# Patient Record
Sex: MALE | Race: WHITE | NOT HISPANIC OR LATINO | Employment: FULL TIME | URBAN - METROPOLITAN AREA
[De-identification: names, ages, dates, MRNs, and addresses within clinical notes are randomized per-mention and may not be internally consistent; named-entity substitution may affect disease eponyms.]

---

## 2023-11-09 PROBLEM — Z96.611 HISTORY OF RIGHT SHOULDER REPLACEMENT: Status: ACTIVE | Noted: 2023-11-09

## 2023-11-09 PROBLEM — E78.5 HYPERLIPIDEMIA: Status: ACTIVE | Noted: 2023-11-09

## 2023-11-09 PROBLEM — M19.90 OSTEOARTHRITIS: Status: ACTIVE | Noted: 2023-11-09

## 2023-11-09 PROBLEM — R73.9 HYPERGLYCEMIA: Status: ACTIVE | Noted: 2023-11-09

## 2023-11-09 PROBLEM — E66.3 OVERWEIGHT WITH BODY MASS INDEX (BMI) OF 28 TO 28.9 IN ADULT: Status: ACTIVE | Noted: 2023-11-09

## 2023-11-09 PROBLEM — I10 HYPERTENSION: Status: ACTIVE | Noted: 2023-11-09

## 2023-11-09 PROBLEM — N52.9 ERECTILE DYSFUNCTION: Status: ACTIVE | Noted: 2023-11-09

## 2023-11-09 PROBLEM — K57.90 DIVERTICULOSIS: Status: ACTIVE | Noted: 2023-11-09

## 2023-11-09 PROBLEM — N52.9 INABILITY TO MAINTAIN ERECTION: Status: ACTIVE | Noted: 2023-11-09

## 2023-11-09 PROBLEM — R93.89 ABNORMAL CT OF THE CHEST: Status: ACTIVE | Noted: 2023-11-09

## 2023-11-09 PROBLEM — K50.90 CROHN DISEASE (MULTI): Status: ACTIVE | Noted: 2023-11-09

## 2023-11-09 PROBLEM — R35.1 NOCTURIA: Status: ACTIVE | Noted: 2023-11-09

## 2023-11-09 RX ORDER — LISINOPRIL 10 MG/1
1 TABLET ORAL DAILY
COMMUNITY
End: 2023-11-10 | Stop reason: SDUPTHER

## 2023-11-09 RX ORDER — METOPROLOL SUCCINATE 100 MG/1
1 TABLET, EXTENDED RELEASE ORAL DAILY
COMMUNITY
End: 2023-11-10 | Stop reason: SDUPTHER

## 2023-11-09 RX ORDER — SULFASALAZINE 500 MG/1
1 TABLET ORAL 2 TIMES DAILY
COMMUNITY
Start: 2021-10-05

## 2023-11-09 RX ORDER — ATORVASTATIN CALCIUM 20 MG/1
1 TABLET, FILM COATED ORAL NIGHTLY
COMMUNITY
End: 2023-11-10 | Stop reason: SDUPTHER

## 2023-11-10 ENCOUNTER — OFFICE VISIT (OUTPATIENT)
Dept: PRIMARY CARE | Facility: CLINIC | Age: 63
End: 2023-11-10
Payer: COMMERCIAL

## 2023-11-10 VITALS
SYSTOLIC BLOOD PRESSURE: 136 MMHG | TEMPERATURE: 98 F | DIASTOLIC BLOOD PRESSURE: 88 MMHG | HEIGHT: 72 IN | WEIGHT: 212 LBS | HEART RATE: 76 BPM | BODY MASS INDEX: 28.71 KG/M2

## 2023-11-10 DIAGNOSIS — I10 HYPERTENSION, UNSPECIFIED TYPE: ICD-10-CM

## 2023-11-10 DIAGNOSIS — Z12.5 SCREENING FOR PROSTATE CANCER: ICD-10-CM

## 2023-11-10 DIAGNOSIS — E78.5 HYPERLIPIDEMIA, UNSPECIFIED HYPERLIPIDEMIA TYPE: ICD-10-CM

## 2023-11-10 DIAGNOSIS — Z23 NEED FOR INFLUENZA VACCINATION: ICD-10-CM

## 2023-11-10 DIAGNOSIS — R73.9 HYPERGLYCEMIA: Primary | ICD-10-CM

## 2023-11-10 PROBLEM — I80.8 SUPERFICIAL THROMBOPHLEBITIS OF UPPER EXTREMITY: Status: ACTIVE | Noted: 2023-11-10

## 2023-11-10 PROBLEM — N52.9 INABILITY TO MAINTAIN ERECTION: Status: RESOLVED | Noted: 2023-11-09 | Resolved: 2023-11-10

## 2023-11-10 PROBLEM — M19.011 OSTEOARTHRITIS OF RIGHT GLENOHUMERAL JOINT: Status: ACTIVE | Noted: 2023-11-10

## 2023-11-10 PROBLEM — R93.89 ABNORMAL CT OF THE CHEST: Status: RESOLVED | Noted: 2023-11-09 | Resolved: 2023-11-10

## 2023-11-10 PROBLEM — I80.8 SUPERFICIAL THROMBOPHLEBITIS OF UPPER EXTREMITY: Status: RESOLVED | Noted: 2023-11-10 | Resolved: 2023-11-10

## 2023-11-10 PROBLEM — R29.898 WEAKNESS OF HAND: Status: ACTIVE | Noted: 2023-11-10

## 2023-11-10 LAB
NON-UH HIE A/G RATIO: 1.6
NON-UH HIE ALB: 4.2 G/DL (ref 3.4–5)
NON-UH HIE ALK PHOS: 55 UNIT/L (ref 45–117)
NON-UH HIE BILIRUBIN, TOTAL: 0.7 MG/DL (ref 0.3–1.2)
NON-UH HIE BUN/CREAT RATIO: 17.3
NON-UH HIE BUN: 19 MG/DL (ref 9–23)
NON-UH HIE CALCIUM: 9.6 MG/DL (ref 8.7–10.4)
NON-UH HIE CALCULATED LDL CHOLESTEROL: 94 MG/DL (ref 60–130)
NON-UH HIE CALCULATED OSMOLALITY: 284 MOSM/KG (ref 275–295)
NON-UH HIE CHLORIDE: 107 MMOL/L (ref 98–107)
NON-UH HIE CHOLESTEROL: 150 MG/DL (ref 100–200)
NON-UH HIE CO2, VENOUS: 28 MMOL/L (ref 20–31)
NON-UH HIE CREATININE: 1.1 MG/DL (ref 0.6–1.1)
NON-UH HIE GFR AA: >60
NON-UH HIE GLOBULIN: 2.7 G/DL
NON-UH HIE GLOMERULAR FILTRATION RATE: >60 ML/MIN/1.73M?
NON-UH HIE GLUCOSE: 109 MG/DL (ref 74–106)
NON-UH HIE GOT: 30 UNIT/L (ref 15–37)
NON-UH HIE GPT: 30 UNIT/L (ref 10–49)
NON-UH HIE HCT: 44 % (ref 41–52)
NON-UH HIE HDL CHOLESTEROL: 46 MG/DL (ref 40–60)
NON-UH HIE HGB A1C: 5.6 %
NON-UH HIE HGB: 14.3 G/DL (ref 13.5–17.5)
NON-UH HIE INSTR WBC ND: 6.4
NON-UH HIE K: 4.4 MMOL/L (ref 3.5–5.1)
NON-UH HIE MCH: 26.6 PG (ref 27–34)
NON-UH HIE MCHC: 32.5 G/DL (ref 32–37)
NON-UH HIE MCV: 82 FL (ref 80–100)
NON-UH HIE MPV: 8 FL (ref 7.4–10.4)
NON-UH HIE NA: 141 MMOL/L (ref 135–145)
NON-UH HIE PLATELET: 184 X10 (ref 150–450)
NON-UH HIE PROSTATIC SPECIFIC ANTIGEN: 1.2 NG/ML (ref 0–4)
NON-UH HIE RBC: 5.37 X10 (ref 4.7–6.1)
NON-UH HIE RDW: 13.9 % (ref 11.5–14.5)
NON-UH HIE TOTAL CHOL/HDL CHOL RATIO: 3.3
NON-UH HIE TOTAL PROTEIN: 6.9 G/DL (ref 5.7–8.2)
NON-UH HIE TRIGLYCERIDES: 50 MG/DL (ref 30–150)
NON-UH HIE WBC: 6.4 X10 (ref 4.5–11)

## 2023-11-10 PROCEDURE — 1036F TOBACCO NON-USER: CPT | Performed by: INTERNAL MEDICINE

## 2023-11-10 PROCEDURE — 99396 PREV VISIT EST AGE 40-64: CPT | Performed by: INTERNAL MEDICINE

## 2023-11-10 PROCEDURE — 90471 IMMUNIZATION ADMIN: CPT | Performed by: INTERNAL MEDICINE

## 2023-11-10 PROCEDURE — 90686 IIV4 VACC NO PRSV 0.5 ML IM: CPT | Performed by: INTERNAL MEDICINE

## 2023-11-10 PROCEDURE — 3075F SYST BP GE 130 - 139MM HG: CPT | Performed by: INTERNAL MEDICINE

## 2023-11-10 PROCEDURE — 3079F DIAST BP 80-89 MM HG: CPT | Performed by: INTERNAL MEDICINE

## 2023-11-10 RX ORDER — METOPROLOL SUCCINATE 100 MG/1
100 TABLET, EXTENDED RELEASE ORAL DAILY
Qty: 90 TABLET | Refills: 3 | Status: SHIPPED | OUTPATIENT
Start: 2023-11-10 | End: 2024-01-15 | Stop reason: SDUPTHER

## 2023-11-10 RX ORDER — METRONIDAZOLE 250 MG/1
250 TABLET ORAL 3 TIMES DAILY
COMMUNITY
Start: 2023-07-17 | End: 2024-04-25 | Stop reason: WASHOUT

## 2023-11-10 RX ORDER — ATORVASTATIN CALCIUM 20 MG/1
20 TABLET, FILM COATED ORAL NIGHTLY
Qty: 90 TABLET | Refills: 3 | Status: SHIPPED | OUTPATIENT
Start: 2023-11-10 | End: 2024-01-15 | Stop reason: SDUPTHER

## 2023-11-10 RX ORDER — LISINOPRIL 10 MG/1
10 TABLET ORAL DAILY
Qty: 90 TABLET | Refills: 3 | Status: SHIPPED | OUTPATIENT
Start: 2023-11-10 | End: 2024-01-15 | Stop reason: SDUPTHER

## 2023-11-10 ASSESSMENT — COLUMBIA-SUICIDE SEVERITY RATING SCALE - C-SSRS
1. IN THE PAST MONTH, HAVE YOU WISHED YOU WERE DEAD OR WISHED YOU COULD GO TO SLEEP AND NOT WAKE UP?: NO
6. HAVE YOU EVER DONE ANYTHING, STARTED TO DO ANYTHING, OR PREPARED TO DO ANYTHING TO END YOUR LIFE?: NO
2. HAVE YOU ACTUALLY HAD ANY THOUGHTS OF KILLING YOURSELF?: NO

## 2023-11-10 ASSESSMENT — PATIENT HEALTH QUESTIONNAIRE - PHQ9
1. LITTLE INTEREST OR PLEASURE IN DOING THINGS: NOT AT ALL
SUM OF ALL RESPONSES TO PHQ9 QUESTIONS 1 AND 2: 0
2. FEELING DOWN, DEPRESSED OR HOPELESS: NOT AT ALL

## 2023-11-10 ASSESSMENT — ENCOUNTER SYMPTOMS
OCCASIONAL FEELINGS OF UNSTEADINESS: 0
DEPRESSION: 0
LOSS OF SENSATION IN FEET: 0

## 2023-11-10 NOTE — PROGRESS NOTES
Chief Complaint   Patient presents with    Annual Exam     62 y.o. man  Last seen 11/2022       last 11/2022  Past Medical History  Hypertension  Hyperlipidemia  Crohn's disease Dr. Pilar Mariano  Anemia, chronic  Diverticulitis  Erectile dysfunction  Hyperglycemia  OA, right shoulder Dr French  RIGHT shoulder replacement 2021  No known drug allergies     Social history   to [place]. No children. Works as an  for [employer]. He is lifelong non-smoker. He exercises routinely.      Blood pressure 136/88, pulse 76, temperature 36.7 °C (98 °F), temperature source Temporal, height 1.829 m (6'), weight 96.2 kg (212 lb).    Vital reviewed  HEENT:Nc/at, EOMI.  Ears tm intact  Throat no exudate  Neck: no goiter  Lymph nodes:  no cervical/clavicular adenopathy  CV: RRR S1 S2 normal. No murmur  Lungs: CTA without wrr. Breath sounds symmetric  Abdomen: normoactive. Soft, nontender. No mass.  Extremities: no pretibial edema  Neuro: speech intact.  Strength 5/5 sensation intact to light touch,no tremor  EOMI, PERRL  No facial asymmetry  Skin: no rash noted.     Labs SW 10/2022 cbc, cmp  Wbc 8.6 hg 14.2 platelet 196  Cr 1.2 glucose elevated 109 liver function tests negative      labs done today await results    George was seen today for annual exam.  Diagnoses and all orders for this visit:  Hyperglycemia (Primary)  -     Comprehensive Metabolic Panel; Future  -     CBC; Future  -     Hemoglobin A1C; Future  Need for influenza vaccination  -     Flu vaccine (IIV4) age 6 months and greater, preservative free  Hyperlipidemia, unspecified hyperlipidemia type  -     atorvastatin (Lipitor) 20 mg tablet; Take 1 tablet (20 mg) by mouth once daily at bedtime.  Hypertension, unspecified type  -     lisinopril 10 mg tablet; Take 1 tablet (10 mg) by mouth once daily.  -     metoprolol succinate XL (Toprol-XL) 100 mg 24 hr tablet; Take 1 tablet (100 mg) by mouth once daily.  -     Lipid Panel; Future  Screening for prostate  cancer  -     Prostate Specific Antigen, Screen; Future    Future labs for 1 year.  Colonoscopy done this year. Task to obtain report  Refills done  F/up 1 year for magaly

## 2023-11-14 ENCOUNTER — TELEPHONE (OUTPATIENT)
Dept: PRIMARY CARE | Facility: CLINIC | Age: 63
End: 2023-11-14
Payer: COMMERCIAL

## 2023-11-14 NOTE — TELEPHONE ENCOUNTER
----- Message from Elana Lehman MD sent at 11/10/2023  5:30 PM EST -----  Received additional test results PSA, prostate test, is normal.

## 2023-11-22 ENCOUNTER — TELEPHONE (OUTPATIENT)
Dept: PRIMARY CARE | Facility: CLINIC | Age: 63
End: 2023-11-22

## 2023-11-22 NOTE — TELEPHONE ENCOUNTER
----- Message from Elana Lehman MD sent at 11/10/2023 10:25 AM EST -----  Please obtain colonoscopy report done 2023 DR Christopher Mariano

## 2024-01-15 DIAGNOSIS — E78.5 HYPERLIPIDEMIA, UNSPECIFIED HYPERLIPIDEMIA TYPE: ICD-10-CM

## 2024-01-15 DIAGNOSIS — I10 HYPERTENSION, UNSPECIFIED TYPE: ICD-10-CM

## 2024-01-15 RX ORDER — LISINOPRIL 10 MG/1
10 TABLET ORAL DAILY
Qty: 90 TABLET | Refills: 3 | Status: SHIPPED | OUTPATIENT
Start: 2024-01-15

## 2024-01-15 RX ORDER — ATORVASTATIN CALCIUM 20 MG/1
20 TABLET, FILM COATED ORAL NIGHTLY
Qty: 90 TABLET | Refills: 3 | Status: SHIPPED | OUTPATIENT
Start: 2024-01-15

## 2024-01-15 RX ORDER — METOPROLOL SUCCINATE 100 MG/1
100 TABLET, EXTENDED RELEASE ORAL DAILY
Qty: 90 TABLET | Refills: 3 | Status: SHIPPED | OUTPATIENT
Start: 2024-01-15

## 2024-04-24 ENCOUNTER — TELEPHONE (OUTPATIENT)
Dept: PRIMARY CARE | Facility: CLINIC | Age: 64
End: 2024-04-24
Payer: COMMERCIAL

## 2024-04-25 ENCOUNTER — OFFICE VISIT (OUTPATIENT)
Dept: PRIMARY CARE | Facility: CLINIC | Age: 64
End: 2024-04-25
Payer: COMMERCIAL

## 2024-04-25 VITALS
TEMPERATURE: 97.9 F | HEIGHT: 72 IN | HEART RATE: 55 BPM | BODY MASS INDEX: 29.2 KG/M2 | OXYGEN SATURATION: 97 % | WEIGHT: 215.6 LBS | SYSTOLIC BLOOD PRESSURE: 148 MMHG | DIASTOLIC BLOOD PRESSURE: 62 MMHG

## 2024-04-25 DIAGNOSIS — M54.6 ACUTE RIGHT-SIDED THORACIC BACK PAIN: Primary | ICD-10-CM

## 2024-04-25 PROCEDURE — 3077F SYST BP >= 140 MM HG: CPT | Performed by: INTERNAL MEDICINE

## 2024-04-25 PROCEDURE — 99214 OFFICE O/P EST MOD 30 MIN: CPT | Performed by: INTERNAL MEDICINE

## 2024-04-25 PROCEDURE — 3078F DIAST BP <80 MM HG: CPT | Performed by: INTERNAL MEDICINE

## 2024-04-25 PROCEDURE — 1036F TOBACCO NON-USER: CPT | Performed by: INTERNAL MEDICINE

## 2024-04-25 RX ORDER — LIDOCAINE 50 MG/G
PATCH TOPICAL
COMMUNITY
Start: 2024-04-21 | End: 2024-04-28

## 2024-04-25 RX ORDER — ORPHENADRINE CITRATE 100 MG/1
100 TABLET, EXTENDED RELEASE ORAL 2 TIMES DAILY
Qty: 10 TABLET | Refills: 0 | Status: SHIPPED | OUTPATIENT
Start: 2024-04-25

## 2024-04-25 RX ORDER — ORPHENADRINE CITRATE 100 MG/1
100 TABLET, EXTENDED RELEASE ORAL 2 TIMES DAILY
COMMUNITY
End: 2024-04-25 | Stop reason: SDUPTHER

## 2024-04-25 ASSESSMENT — PAIN SCALES - GENERAL: PAINLEVEL: 4

## 2024-04-25 ASSESSMENT — PATIENT HEALTH QUESTIONNAIRE - PHQ9
1. LITTLE INTEREST OR PLEASURE IN DOING THINGS: NOT AT ALL
SUM OF ALL RESPONSES TO PHQ9 QUESTIONS 1 & 2: 0
2. FEELING DOWN, DEPRESSED OR HOPELESS: NOT AT ALL

## 2024-04-25 NOTE — PROGRESS NOTES
Chief Complaint   Patient presents with    Hospital Follow-up     Pt here for follow up after SW ER on 4/21/24     63 y.o. man  Last seen 11/2023.  Follow-up from emergency room.  Back pain for about 5 days.  His low back more to the right side.  Few days earlier to a week earlier he was lifting something for an eye wash station and felt like he did something he did have pain right away and came later.  He denies any radiation into the buttock or down the leg.  Pain persisted so he went to the ER.  CAT scan thoracic spine showed multilevel osteophytes no fracture identified.  UA negative.  He was prescribed Norflex and Percocet.  He took the Percocet twice.  He is on Norflex.  There is some improvement.  He does need form completed for work because of these medications.  He feels that he needs the medication for a few more nights specifically this is the muscle relaxer.  He is planning to return to work on Sunday, April 28 and needs documentation that this would be okay    Past Medical History  Hypertension  Hyperlipidemia  Crohn's disease Dr. Pilar Mariano  Anemia, chronic  Diverticulitis  Erectile dysfunction  Hyperglycemia  OA, right shoulder Dr French  RIGHT shoulder replacement 2021  No known drug allergies     Social history   to [place]. No children. Works as an  for [employer]. He is lifelong non-smoker. He exercises routinely.      Blood pressure 148/62, pulse 55, temperature 36.6 °C (97.9 °F), height 1.829 m (6'), weight 97.8 kg (215 lb 9.6 oz), SpO2 97%.  Cardiovascular: Regular rate rhythm S1-S2 normal no murmur  Lungs: Clear to auscultation without wheezing rales or rhonchi.  Breath sounds symmetric  Lymph nodes: No clavicular, submandibular or cervical adenopathy noted  Extremities: No pretibial edema.  Back nontender with palpation along the thoracic and lumbar spine itself.  Does have an area of tenderness lower thoracic on the right-hand side.  Did not reproduce on exam.  Straight leg  raise has some tenderness right anterior thigh.  Range of motion intact.  Straight leg raise on the left is unremarkable.  Lower extremity strength 5 out of 5 and symmetric.  Sensation intact to light touch.  Straight leg raise in sitting position is negative    Reviewed ER report including CT thoracic spine      Labs March 2024 CBC, CMP, vitamin D per rheumatologist Dr. Hodgson  White blood cell count 5.7 hemoglobin 14 hematocrit 43.1 platelet 179  ALT 43 AST 34 creatinine 1.18 glucose elevated 101 potassium 4.3 sodium 139 vitamin D low 28    Labs  10/2022 cbc, cmp  Wbc 8.6 hg 14.2 platelet 196  Cr 1.2 glucose elevated 109 liver function tests negative       1. Acute right-sided thoracic back pain  He does have some muscle relaxer at night.  Discussed that sent in Rx for another course in case he needs a few more days.  He only takes it at night.  Advised he may return to work on Sunday, April 28, 2024.  He may resume full duties but advised not to take muscle relaxer within 8 hours of his shift.  He verbalized understanding of this and agreement with plan.  - orphenadrine (Norflex) 100 mg 12 hr tablet; Take 1 tablet (100 mg) by mouth 2 times a day.  Dispense: 10 tablet; Refill: 0  Current Outpatient Medications on File Prior to Visit   Medication Sig Dispense Refill    atorvastatin (Lipitor) 20 mg tablet Take 1 tablet (20 mg) by mouth once daily at bedtime. 90 tablet 3    lidocaine (Lidoderm) 5 % patch 1 patches, Topical, DAILY, remove patches after 12 hours, X 7 days      lisinopril 10 mg tablet Take 1 tablet (10 mg) by mouth once daily. 90 tablet 3    metoprolol succinate XL (Toprol-XL) 100 mg 24 hr tablet Take 1 tablet (100 mg) by mouth once daily. 90 tablet 3    orphenadrine (Norflex) 100 mg 12 hr tablet Take 1 tablet (100 mg) by mouth 2 times a day.      sulfaSALAzine (Azulfidine) 500 mg tablet Take 1 tablet (500 mg) by mouth 2 times a day.      metroNIDAZOLE (Flagyl) 250 mg tablet Take 1 tablet (250 mg) by  mouth 3 times a day.       No current facility-administered medications on file prior to visit.

## 2024-05-17 ENCOUNTER — APPOINTMENT (OUTPATIENT)
Dept: NEUROSURGERY | Facility: CLINIC | Age: 64
End: 2024-05-17
Payer: COMMERCIAL

## 2024-11-09 LAB
NON-UH HIE A/G RATIO: 1.4
NON-UH HIE ALB: 4.2 G/DL (ref 3.4–5)
NON-UH HIE ALK PHOS: 51 UNIT/L (ref 45–117)
NON-UH HIE BILIRUBIN, TOTAL: 0.7 MG/DL (ref 0.3–1.2)
NON-UH HIE BUN/CREAT RATIO: 24
NON-UH HIE BUN: 24 MG/DL (ref 9–23)
NON-UH HIE CALCIUM: 9.8 MG/DL (ref 8.7–10.4)
NON-UH HIE CALCULATED LDL CHOLESTEROL: 98 MG/DL (ref 60–130)
NON-UH HIE CALCULATED OSMOLALITY: 292 MOSM/KG (ref 275–295)
NON-UH HIE CHLORIDE: 108 MMOL/L (ref 98–107)
NON-UH HIE CHOLESTEROL: 154 MG/DL (ref 100–200)
NON-UH HIE CO2, VENOUS: 28 MMOL/L (ref 20–31)
NON-UH HIE CREATININE: 1 MG/DL (ref 0.6–1.1)
NON-UH HIE GFR AA: >60
NON-UH HIE GLOBULIN: 3 G/DL
NON-UH HIE GLOMERULAR FILTRATION RATE: >60 ML/MIN/1.73M?
NON-UH HIE GLUCOSE: 121 MG/DL (ref 74–106)
NON-UH HIE GOT: 31 UNIT/L (ref 15–37)
NON-UH HIE GPT: 30 UNIT/L (ref 10–49)
NON-UH HIE HCT: 44.4 % (ref 41–52)
NON-UH HIE HDL CHOLESTEROL: 43 MG/DL (ref 40–60)
NON-UH HIE HGB A1C: 5.6 %
NON-UH HIE HGB: 14.4 G/DL (ref 13.5–17.5)
NON-UH HIE INSTR WBC ND: 7
NON-UH HIE K: 4.4 MMOL/L (ref 3.5–5.1)
NON-UH HIE MCH: 26.6 PG (ref 27–34)
NON-UH HIE MCHC: 32.5 G/DL (ref 32–37)
NON-UH HIE MCV: 81.8 FL (ref 80–100)
NON-UH HIE MPV: 8.2 FL (ref 7.4–10.4)
NON-UH HIE NA: 144 MMOL/L (ref 135–145)
NON-UH HIE PLATELET: 179 X10 (ref 150–450)
NON-UH HIE PROSTATIC SPECIFIC AG SCREEN: 1.4 NG/ML (ref 0–4)
NON-UH HIE RBC: 5.42 X10 (ref 4.7–6.1)
NON-UH HIE RDW: 13.8 % (ref 11.5–14.5)
NON-UH HIE TOTAL CHOL/HDL CHOL RATIO: 3.6
NON-UH HIE TOTAL PROTEIN: 7.2 G/DL (ref 5.7–8.2)
NON-UH HIE TRIGLYCERIDES: 67 MG/DL (ref 30–150)
NON-UH HIE WBC: 7 X10 (ref 4.5–11)

## 2024-11-09 ASSESSMENT — PROMIS GLOBAL HEALTH SCALE
RATE_GENERAL_HEALTH: GOOD
RATE_QUALITY_OF_LIFE: VERY GOOD
RATE_AVERAGE_FATIGUE: MILD
CARRYOUT_PHYSICAL_ACTIVITIES: COMPLETELY
RATE_SOCIAL_SATISFACTION: VERY GOOD
CARRYOUT_SOCIAL_ACTIVITIES: EXCELLENT
RATE_MENTAL_HEALTH: EXCELLENT
EMOTIONAL_PROBLEMS: NEVER
RATE_PHYSICAL_HEALTH: VERY GOOD
RATE_AVERAGE_PAIN: 2

## 2024-11-15 ENCOUNTER — APPOINTMENT (OUTPATIENT)
Dept: PRIMARY CARE | Facility: CLINIC | Age: 64
End: 2024-11-15
Payer: COMMERCIAL

## 2024-11-15 VITALS
HEIGHT: 72 IN | SYSTOLIC BLOOD PRESSURE: 130 MMHG | DIASTOLIC BLOOD PRESSURE: 78 MMHG | WEIGHT: 215.8 LBS | HEART RATE: 60 BPM | TEMPERATURE: 97.9 F | OXYGEN SATURATION: 99 % | BODY MASS INDEX: 29.23 KG/M2

## 2024-11-15 DIAGNOSIS — E78.5 HYPERLIPIDEMIA, UNSPECIFIED HYPERLIPIDEMIA TYPE: ICD-10-CM

## 2024-11-15 DIAGNOSIS — K50.919 CROHN'S DISEASE WITH COMPLICATION, UNSPECIFIED GASTROINTESTINAL TRACT LOCATION: ICD-10-CM

## 2024-11-15 DIAGNOSIS — Z11.4 SCREENING FOR HIV (HUMAN IMMUNODEFICIENCY VIRUS): ICD-10-CM

## 2024-11-15 DIAGNOSIS — Z11.59 NEED FOR HEPATITIS C SCREENING TEST: ICD-10-CM

## 2024-11-15 DIAGNOSIS — Z12.5 SCREENING FOR PROSTATE CANCER: ICD-10-CM

## 2024-11-15 DIAGNOSIS — I10 HYPERTENSION, UNSPECIFIED TYPE: ICD-10-CM

## 2024-11-15 DIAGNOSIS — R73.9 HYPERGLYCEMIA: ICD-10-CM

## 2024-11-15 DIAGNOSIS — Z00.00 HEALTHCARE MAINTENANCE: Primary | ICD-10-CM

## 2024-11-15 PROBLEM — E66.3 OVERWEIGHT WITH BODY MASS INDEX (BMI) OF 28 TO 28.9 IN ADULT: Status: RESOLVED | Noted: 2023-11-09 | Resolved: 2024-11-15

## 2024-11-15 PROCEDURE — 3075F SYST BP GE 130 - 139MM HG: CPT | Performed by: INTERNAL MEDICINE

## 2024-11-15 PROCEDURE — 1036F TOBACCO NON-USER: CPT | Performed by: INTERNAL MEDICINE

## 2024-11-15 PROCEDURE — 3078F DIAST BP <80 MM HG: CPT | Performed by: INTERNAL MEDICINE

## 2024-11-15 PROCEDURE — 99396 PREV VISIT EST AGE 40-64: CPT | Performed by: INTERNAL MEDICINE

## 2024-11-15 PROCEDURE — 3008F BODY MASS INDEX DOCD: CPT | Performed by: INTERNAL MEDICINE

## 2024-11-15 RX ORDER — ATORVASTATIN CALCIUM 20 MG/1
20 TABLET, FILM COATED ORAL NIGHTLY
Qty: 90 TABLET | Refills: 3 | Status: SHIPPED | OUTPATIENT
Start: 2024-11-15

## 2024-11-15 RX ORDER — METRONIDAZOLE 250 MG/1
250 TABLET ORAL AS NEEDED
COMMUNITY
Start: 2024-11-14

## 2024-11-15 RX ORDER — LISINOPRIL 10 MG/1
10 TABLET ORAL DAILY
Qty: 90 TABLET | Refills: 3 | Status: SHIPPED | OUTPATIENT
Start: 2024-11-15

## 2024-11-15 RX ORDER — METOPROLOL SUCCINATE 100 MG/1
100 TABLET, EXTENDED RELEASE ORAL DAILY
Qty: 90 TABLET | Refills: 3 | Status: SHIPPED | OUTPATIENT
Start: 2024-11-15

## 2024-11-15 SDOH — ECONOMIC STABILITY: TRANSPORTATION INSECURITY
IN THE PAST 12 MONTHS, HAS THE LACK OF TRANSPORTATION KEPT YOU FROM MEDICAL APPOINTMENTS OR FROM GETTING MEDICATIONS?: NO

## 2024-11-15 SDOH — ECONOMIC STABILITY: TRANSPORTATION INSECURITY
IN THE PAST 12 MONTHS, HAS LACK OF TRANSPORTATION KEPT YOU FROM MEETINGS, WORK, OR FROM GETTING THINGS NEEDED FOR DAILY LIVING?: NO

## 2024-11-15 SDOH — HEALTH STABILITY: PHYSICAL HEALTH: ON AVERAGE, HOW MANY DAYS PER WEEK DO YOU ENGAGE IN MODERATE TO STRENUOUS EXERCISE (LIKE A BRISK WALK)?: 6 DAYS

## 2024-11-15 SDOH — HEALTH STABILITY: PHYSICAL HEALTH: ON AVERAGE, HOW MANY MINUTES DO YOU ENGAGE IN EXERCISE AT THIS LEVEL?: 60 MIN

## 2024-11-15 ASSESSMENT — LIFESTYLE VARIABLES
AUDIT-C TOTAL SCORE: 0
SKIP TO QUESTIONS 9-10: 1
HOW OFTEN DO YOU HAVE A DRINK CONTAINING ALCOHOL: NEVER
HOW MANY STANDARD DRINKS CONTAINING ALCOHOL DO YOU HAVE ON A TYPICAL DAY: PATIENT DOES NOT DRINK
HOW OFTEN DO YOU HAVE SIX OR MORE DRINKS ON ONE OCCASION: NEVER

## 2024-11-15 ASSESSMENT — PAIN SCALES - GENERAL: PAINLEVEL_OUTOF10: 0-NO PAIN

## 2024-11-15 ASSESSMENT — PATIENT HEALTH QUESTIONNAIRE - PHQ9
SUM OF ALL RESPONSES TO PHQ9 QUESTIONS 1 & 2: 0
1. LITTLE INTEREST OR PLEASURE IN DOING THINGS: NOT AT ALL
2. FEELING DOWN, DEPRESSED OR HOPELESS: NOT AT ALL

## 2024-11-15 NOTE — PROGRESS NOTES
Chief Complaint   Patient presents with    Annual Exam     Pt here for AWV       63 y.o. for AWV  Last visit 11/2023. No c/o. Continues to exercise. Compliant with medication  No fever/chills/night sweats/swollen glands  Patient denies chest pain, pressure, palpitations, or shortness of breath.  No abd pain. Denies GI or  symptoms. No bleeding.    Past Medical History  Hypertension  Hyperlipidemia  Crohn's disease Dr. Pilar Mariano scope 2023  Anemia, chronic  Diverticulitis  Erectile dysfunction  Hyperglycemia  OA, right shoulder Dr French  RIGHT shoulder replacement 2021    No known drug allergies     Social history   to [place]. No children. Works as an  for [employer]. He is lifelong non-smoker. He exercises routinely.         Blood pressure 130/78, pulse 60, temperature 36.6 °C (97.9 °F), height 1.829 m (6'), weight 111 kg (245 lb 12.8 oz), SpO2 99%.  Body mass index is 33.34 kg/m².    Physical Examination  General: NAD. Alert.   HEENT: Normocephalic atraumatic.    Eyes: no scleral icterus. Extraocular movements intact.  Pupils equal round and reactive to light.  Ears: TM intact.  No cerumen. Hearing grossly intact.   Throat: No exudate  Neck:  Supple. No thyroid goiter.  Lymph nodes: No cervical or clavicular adenopathy  Cardiovascular: Regular rate rhythm S1-S2 normal no murmur. No carotid bruit.   Lungs: Clear to Auscultation without wheezing, rales, or rhonchi, Breath sounds symmetric. No use of accessory muscles  Abdomen:  Normoactive bowel sounds, soft, non-tender. No mass.  No organomegaly  Extremities: No pretibial edema  Neuro: no facial asymmetry. Strength upper and lower extremities 5/5. Sensation intact to light touch. No tremor. Babinski negative  Skin: no rash noted  Feet discolored great toenails.   Vascular: DP pulses intact.     Labs 11/2024 lipid, cmp, PSA, A1c, cbc  Reviewed glucose elevated A1c within goal. A1c stable compared to last year.     ASSESSMENT/PLAN  AWV  Living Will:  advised  Cognition/Memory if 65 or above.   Hepatitis C screen (18-79) ordered  HIV screen(18-64, once) ordered  PSA 11/2024: PSA 1.4 normal.   Colon cancer screening 45 and older:  03/2023 colonoscopy repeat due 2028  Immunization: flu vaccine at work 11/01/2024  Depression screen: denies   Smoker/Former smoker: screen US aorta (man 65-75)  n/a  Age 50-75, 20 pack year history, quit within 15 years or currently smoking  (medicare 55-77, 30 pack year)     1. Crohn's disease with complication, unspecified gastrointestinal tract location      2. Healthcare maintenance (Primary)    3. Hyperlipidemia, unspecified hyperlipidemia type  States he was eating more sweets. Recently cut out sweets and working on dietary modification.   - atorvastatin (Lipitor) 20 mg tablet; Take 1 tablet (20 mg) by mouth once daily at bedtime.  Dispense: 90 tablet; Refill: 3  - Lipid Panel; Future  - Comprehensive Metabolic Panel; Future    4. Hypertension, unspecified type  Fair control. Asymptomatic.   - lisinopril 10 mg tablet; Take 1 tablet (10 mg) by mouth once daily.  Dispense: 90 tablet; Refill: 3  - metoprolol succinate XL (Toprol-XL) 100 mg 24 hr tablet; Take 1 tablet (100 mg) by mouth once daily.  Dispense: 90 tablet; Refill: 3    5. Screening for HIV (human immunodeficiency virus)    - HIV 1/2 Antigen/Antibody Screen with Reflex to Confirmation; Future    6. Need for hepatitis C screening test  - Hepatitis C Antibody; Future    7. Hyperglycemia  - Comprehensive Metabolic Panel; Future  - Hemoglobin A1C; Future  - CBC; Future    8. Screening for prostate cancer  - Prostate Specific Antigen, Screen; Future    Labs and f/up 1year. And prn   Current Outpatient Medications on File Prior to Visit   Medication Sig Dispense Refill    atorvastatin (Lipitor) 20 mg tablet Take 1 tablet (20 mg) by mouth once daily at bedtime. 90 tablet 3    lisinopril 10 mg tablet Take 1 tablet (10 mg) by mouth once daily. 90 tablet 3    metoprolol succinate XL  (Toprol-XL) 100 mg 24 hr tablet Take 1 tablet (100 mg) by mouth once daily. 90 tablet 3    metroNIDAZOLE (Flagyl) 250 mg tablet Take 1 tablet (250 mg) by mouth if needed.      orphenadrine (Norflex) 100 mg 12 hr tablet Take 1 tablet (100 mg) by mouth 2 times a day. 10 tablet 0    sulfaSALAzine (Azulfidine) 500 mg tablet Take 1 tablet (500 mg) by mouth 2 times a day.       No current facility-administered medications on file prior to visit.

## 2025-11-21 ENCOUNTER — APPOINTMENT (OUTPATIENT)
Dept: PRIMARY CARE | Facility: CLINIC | Age: 65
End: 2025-11-21
Payer: COMMERCIAL